# Patient Record
(demographics unavailable — no encounter records)

---

## 2019-09-24 NOTE — [facility]
Bilateral digital screening mammograms:

 

Reason for examination: Routine screening. New baseline.

 

Interpretation was made with the benefit of CAD.

 

The skin and nipples show no abnormalities. No abnormal axillary lymph 

nodes are seen. The breast parenchyma shows scattered fibroglandular 

density. (Breast density: Category B.) There are no dominant masses, 

suspicious calcifications or architectural distortions.

 

Impression:

 

No evidence of malignancy. Recommend routine screening.

 

BI-RADS Category 1:  Negative.

 

"Our facility is accredited by the [employer] 

Mammography Program."

 

This patient's information has been entered into a reminder system for the

patient to be notified with the results of her examination and a target 

date for the next mammogram.

 

Electronically signed by: Charo Trejo MD (9/24/2019 1:08 PM) [facility]

## 2019-09-24 NOTE — [facility]
EXAM: Right shoulder, 3 views.

 

HISTORY: Pulling injury.

 

COMPARISON: None.

 

FINDINGS: 3 views of the right shoulder obtained. There is no fracture, 

dislocation or subluxation. There is mild acromioclavicular joint 

osteoarthritis with subacromial intervertebral directed distal clavicular 

spurs. There is a suspected tiny joint loose body along the inferior 

glenohumeral joint.

 

IMPRESSION: 

1. Mild acromioclavicular osteoarthritis.

2. Suspected tiny inferior glenohumeral joint loose body.

 

Electronically signed by: Dilcia Roberts MD (9/24/2019 1:24 PM) [facility]

## 2020-05-21 NOTE — RAD
MR of the right shoulder

 

HISTORY: Right shoulder pain after injury.

 

TECHNIQUE: Routine multiplanar sequences are obtained.

 

FINDINGS:

There is mild motion degradation.

 

The acromioclavicular joint is degenerative with undersurface osteophytes.

 

Complete full-thickness rupture of the supraspinatus tendon measures 3 cm 

AP diameter with 2 cm retraction. Moderate muscle atrophy. Tendinosis 

identified within the more posterior rotator cuff and subscapularis 

tendon. Partial mild tearing of the upper subscapularis tendon fibers. 

Trace subdeltoid subbursal fluid.

 

Glenohumeral joint degenerative disease. No evidence of labral tear or 

detachment. No para labral cyst.

 

Biceps tendinosis.

 

No acute fracture. No aggressive bone destruction. No acute soft tissue 

abnormality.

 

IMPRESSION:

1. Moderate full-thickness rotator cuff tear of the supraspinatus tendon 

with retraction and atrophy. Mild partial subscapularis tendon tear.

2. Biceps tendinosis.

3. Acromioclavicular joint DJD with inferior osteophytes.

 

Electronically signed by: Chandan Márquez MD (5/21/2020 4:54 PM) [id number]

## 2020-07-22 NOTE — PDOC4
Operative Note


Operative Note


Date of procedure: 7/22/2020





Surgeon: Salomón Quintana





Assistant: Khushbu De Los Santos





Preoperative diagnosis: Right shoulder rotator cuff tear





Postop diagnosis: 2 tendon rotator cuff tear of right shoulder





Procedure performed: Arthroscopic right shoulder rotator cuff repair





Anesthesia: General





Findings: Patient had a tear at her biceps tendon


Labrum was intact circumferentially


Glenohumeral cartilage was unremarkable


No loose bodies


Subscapularis intact and teres minor intact


Supraspinatus and infraspinatus were torn and retracted to mid humeral head





Blood loss: 10 mL





Complications: None





Components inserted: [employer] Helacoil x 2, footprint for lateral row 

fixation





Reason for procedure: Patient is a very pleasant 60-year-old female who was 

referred to my outpatient orthopedic surgery clinic for complaints of shoulder 

pain and dysfunction.  She had tried and failed conservative therapies, clinical

and radiographic examination were consistent with the above preoperative 

diagnosis and we had a discussion of the risks, benefits, and alternatives and 

she wished to proceed with surgery.





Description of procedure: Patient was greeted in the preoperative area by myself

the correct extremity was verified and marked.  She was taken to the operative 

suite and antibiotics were started as she was brought back.  Once in the opera

ting room, she was transferred gently supine to the operating room table and had

successful induction of a general anesthetic.  She was in sat up in a beachchair

position maintaining her C-spine in a neutral position and a large pad under her

legs.  She was secured to the bed with all pressure points padded.  We then 

proceeded to prep and drape right upper extremity in her usual sterile fashion 

including [place] at the periphery.  We conducted our standard preoperative 

timeout.  I then palpated marked surface anatomy and italia lines for my planned 

portals.  I used a spinal needle to localize the posterior superior portal and 

incised skin accordance with this.  After this and introduce a blunt 

arthroscopic trocar followed by the camera into the glenohumeral joint.  I then 

used the spinal needle to localize an anterosuperior portal and incised skin in 

accordance with this.  I then placed my probe into the glenohumeral joint and 

conducted my diagnostic arthroscopy with above-noted findings.  I then created 

my lateral portal under spinal needle localization and proceeded to debride the 

footprint, taking care not to decorticate the bone.  I also debrided the small 

flap tear at her biceps tendon.  After this, I repositioned the camera into the 

subacromial space and performed a bursectomy with combination of shaver and 

electrocautery device.  I then inspected the mobility of the tear, it was quite 

mobile.  After this, I placed my 2 anchors for my rotator cuff fixation, noting 

her bone was soft.  After this, I created an accessory anterolateral posterior 

lateral portals for suture management.  I shuttled all my sutures out through 

the accessory anterolateral portal and then proceeded to pass them with the 

first pass suture passing device in a simple configuration, taking them out the 

posterior portal as I went.  After passing on my suture I then re-shuttled 

everything out through the accessory anterolateral portal and then proceeded to 

tie down the rotator cuff tendon from posterior to anterior using arthroscopic 

knot tying technique.  I cut one limb from each suture.  I then placed my all 

and created a hole for my footprint fixation device.  While inspecting the 

repair and sutures I opted to cut the posterior most limb as I like the 

trajectory with just 3 limbs better.  I then passed these through my footprint 

device and impacted in position and then cut the limbs at this area.  I then 

tested the rotator cuff repair, stable to probing and gentle rotation of the 

arm.  I then removed all excess arthroscopic fluid in the arthroscopic 

instrumentation.  The portals were then closed with simple interrupted 3-0 

nylon.  Xeroform and a soft bulky dressing was applied and taped in place to her

shoulder.  Her right upper extremities and placed into an abduction pillow sling

and she was laid supine.  She was transferred gently supine to the recovery room

cart and taken to PACU in stable and extubated condition.  Postoperative plan is

to discharge her home.  She will be nonweightbearing.  I will see her back and 

get her started on physical therapy in 2 weeks.  She will follow-up with me at 

the 2-week brianne, sooner should a problem arise.











SALOMÓN QUINTANA II, MD        Jul 22, 2020 09:11

## 2020-07-22 NOTE — DISCH
DISCHARGE INSTRUCTIONS


Condition on Discharge


Condition on Discharge:  Stable





Activity After Discharge


Activity Instructions for Disc:  Other, see below


Other activity instructions:  arm to remain in sling


Bathing Instructions:  Shower-keep dressing dry


Weight Bearing Status after Di:  Non weight bearing





Diet after Discharge


Diet after Discharge:  Regular





Wound Incision Care


Wound/Incision Care:  Ice to area for comfort, Keep wound/cast CDI, Change 

dressing


Other wound/incision instructi:  ok to change dressing after 2 days





Contacting the DR. after DC


Call your doctor for:  Concerns you may have





Follow-Up


Follow up with:  Tomi in 2 wks





Treatment/Equipment after DC


Adaptive Equipment Issued:  None











FABIOLA QUINTANA II, MD        Jul 22, 2020 07:38